# Patient Record
Sex: MALE | Race: OTHER | URBAN - METROPOLITAN AREA
[De-identification: names, ages, dates, MRNs, and addresses within clinical notes are randomized per-mention and may not be internally consistent; named-entity substitution may affect disease eponyms.]

---

## 2018-01-07 ENCOUNTER — EMERGENCY (EMERGENCY)
Facility: HOSPITAL | Age: 73
LOS: 1 days | Discharge: HOME | End: 2018-01-07
Attending: EMERGENCY MEDICINE | Admitting: EMERGENCY MEDICINE
Payer: MEDICARE

## 2018-01-07 VITALS
OXYGEN SATURATION: 100 % | TEMPERATURE: 99 F | HEART RATE: 75 BPM | DIASTOLIC BLOOD PRESSURE: 75 MMHG | SYSTOLIC BLOOD PRESSURE: 145 MMHG | RESPIRATION RATE: 16 BRPM

## 2018-01-07 LAB
ALBUMIN SERPL ELPH-MCNC: 3.7 G/DL — SIGNIFICANT CHANGE UP (ref 3.3–5)
ALP SERPL-CCNC: 57 U/L — SIGNIFICANT CHANGE UP (ref 40–120)
ALT FLD-CCNC: 12 U/L — SIGNIFICANT CHANGE UP (ref 4–41)
APTT BLD: 39.2 SEC — HIGH (ref 27.5–37.4)
AST SERPL-CCNC: 19 U/L — SIGNIFICANT CHANGE UP (ref 4–40)
BASOPHILS # BLD AUTO: 0.03 K/UL — SIGNIFICANT CHANGE UP (ref 0–0.2)
BASOPHILS NFR BLD AUTO: 0.3 % — SIGNIFICANT CHANGE UP (ref 0–2)
BILIRUB SERPL-MCNC: 0.4 MG/DL — SIGNIFICANT CHANGE UP (ref 0.2–1.2)
BUN SERPL-MCNC: 33 MG/DL — HIGH (ref 7–23)
CALCIUM SERPL-MCNC: 8.8 MG/DL — SIGNIFICANT CHANGE UP (ref 8.4–10.5)
CHLORIDE SERPL-SCNC: 94 MMOL/L — LOW (ref 98–107)
CO2 SERPL-SCNC: 27 MMOL/L — SIGNIFICANT CHANGE UP (ref 22–31)
CREAT SERPL-MCNC: 2.45 MG/DL — HIGH (ref 0.5–1.3)
EOSINOPHIL # BLD AUTO: 0 K/UL — SIGNIFICANT CHANGE UP (ref 0–0.5)
EOSINOPHIL NFR BLD AUTO: 0 % — SIGNIFICANT CHANGE UP (ref 0–6)
GLUCOSE SERPL-MCNC: 128 MG/DL — HIGH (ref 70–99)
HCT VFR BLD CALC: 38.8 % — LOW (ref 39–50)
HGB BLD-MCNC: 12.8 G/DL — LOW (ref 13–17)
IMM GRANULOCYTES # BLD AUTO: 0.04 # — SIGNIFICANT CHANGE UP
IMM GRANULOCYTES NFR BLD AUTO: 0.4 % — SIGNIFICANT CHANGE UP (ref 0–1.5)
INR BLD: 2.25 — HIGH (ref 0.88–1.17)
LYMPHOCYTES # BLD AUTO: 0.91 K/UL — LOW (ref 1–3.3)
LYMPHOCYTES # BLD AUTO: 8.5 % — LOW (ref 13–44)
MCHC RBC-ENTMCNC: 28.8 PG — SIGNIFICANT CHANGE UP (ref 27–34)
MCHC RBC-ENTMCNC: 33 % — SIGNIFICANT CHANGE UP (ref 32–36)
MCV RBC AUTO: 87.4 FL — SIGNIFICANT CHANGE UP (ref 80–100)
MONOCYTES # BLD AUTO: 1.07 K/UL — HIGH (ref 0–0.9)
MONOCYTES NFR BLD AUTO: 10 % — SIGNIFICANT CHANGE UP (ref 2–14)
NEUTROPHILS # BLD AUTO: 8.69 K/UL — HIGH (ref 1.8–7.4)
NEUTROPHILS NFR BLD AUTO: 80.8 % — HIGH (ref 43–77)
NRBC # FLD: 0 — SIGNIFICANT CHANGE UP
PLATELET # BLD AUTO: 151 K/UL — SIGNIFICANT CHANGE UP (ref 150–400)
PMV BLD: 11.5 FL — SIGNIFICANT CHANGE UP (ref 7–13)
POTASSIUM SERPL-MCNC: 3.9 MMOL/L — SIGNIFICANT CHANGE UP (ref 3.5–5.3)
POTASSIUM SERPL-SCNC: 3.9 MMOL/L — SIGNIFICANT CHANGE UP (ref 3.5–5.3)
PROT SERPL-MCNC: 7.7 G/DL — SIGNIFICANT CHANGE UP (ref 6–8.3)
PROTHROM AB SERPL-ACNC: 25.4 SEC — HIGH (ref 9.8–13.1)
RBC # BLD: 4.44 M/UL — SIGNIFICANT CHANGE UP (ref 4.2–5.8)
RBC # FLD: 14 % — SIGNIFICANT CHANGE UP (ref 10.3–14.5)
SODIUM SERPL-SCNC: 133 MMOL/L — LOW (ref 135–145)
WBC # BLD: 10.74 K/UL — HIGH (ref 3.8–10.5)
WBC # FLD AUTO: 10.74 K/UL — HIGH (ref 3.8–10.5)

## 2018-01-07 PROCEDURE — 99285 EMERGENCY DEPT VISIT HI MDM: CPT | Mod: GC

## 2018-01-07 RX ORDER — OXYCODONE AND ACETAMINOPHEN 5; 325 MG/1; MG/1
1 TABLET ORAL ONCE
Qty: 0 | Refills: 0 | Status: COMPLETED | OUTPATIENT
Start: 2018-01-07 | End: 2018-01-07

## 2018-01-07 RX ORDER — OXYCODONE AND ACETAMINOPHEN 5; 325 MG/1; MG/1
1 TABLET ORAL ONCE
Qty: 0 | Refills: 0 | Status: DISCONTINUED | OUTPATIENT
Start: 2018-01-07 | End: 2018-01-07

## 2018-01-07 RX ADMIN — OXYCODONE AND ACETAMINOPHEN 1 TABLET(S): 5; 325 TABLET ORAL at 15:43

## 2018-01-07 RX ADMIN — OXYCODONE AND ACETAMINOPHEN 1 TABLET(S): 5; 325 TABLET ORAL at 11:40

## 2018-01-07 RX ADMIN — Medication 1 TABLET(S): at 15:41

## 2018-01-07 NOTE — ED PROVIDER NOTE - OBJECTIVE STATEMENT
73 y/o m c/o tooth pain/swelling. SYmptoms started 2 days ago, pain constant, located in front upper tooth, moderate, associated with swelling to gum/lip and part of face. No associated fevers, ha, cp, sob, abd pain, vomiting, diarrhea, dysuria. patient on coumadin as had prior stroke. 71 y/o m c/o tooth pain/swelling. SYmptoms started 2 days ago, pain constant, located in front upper tooth, moderate, associated with swelling to gum/lip and part of face. No associated fevers, ha, cp, sob, difficulty opening mouth, abd pain, vomiting, diarrhea, dysuria. patient on coumadin as had prior stroke.

## 2018-01-07 NOTE — ED PROVIDER NOTE - MOUTH
+swelling ext from tooth 10 to left cheek and infraorbital region, no crepitus, no facial warmth, no trismus, no tongue elevation or drooling

## 2018-01-07 NOTE — ED PROVIDER NOTE - PROGRESS NOTE DETAILS
Dr. Sanjay Orantes PGY1: paged dental for consult dental res got stuck in procedure she needed to address but will see patient as soon as she is done Dr. Sanjay Orantes PGY1: I&D performed by dental and OMFS in ED, requested augmentin prescription, will give one dose in ED; will be dc'd w/ outpt dental clinic f/u Dr. Sanjay Orantes PGY1: I&D performed by dental and OMFS in ED, requested augmentin prescription, will give one dose in ED; will be dc'd w/ outpt dental clinic f/u in 2 d for removal of catheter placed by them

## 2018-01-07 NOTE — ED ADULT NURSE NOTE - OBJECTIVE STATEMENT
Pt received to intake room 10B with reports of L sided dental pain x2 days. Pt received awake, alert, oriented x4. Pt denies SOB or chest pain, denies difficulty breathing or swallowing. L side of face observed to be visibly swollen. Pt currently reporting pain as 10/10 at this time, pt medicated per orders. 20g PIV placed in L AC, labs drawn and sent per orders. Visitor at bedside identified as son. Will continue to monitor.

## 2018-01-07 NOTE — PROGRESS NOTE ADULT - SUBJECTIVE AND OBJECTIVE BOX
Patient is a 72y old  Male who presents with a chief complaint of facial swelling that began three days ago.   HPI: Facial swelling was worst on Friday, per patient's son. On Friday, patient's son reports patient's left eye was swollen shut. Patient reports no difficulty seeing.     PAST MEDICAL & SURGICAL HISTORY:  Pancreatitis  Stroke  DM (diabetes mellitus)  HTN (hypertension)  No significant past surgical history      MEDICATIONS  (STANDING):  oxyCODONE    5 mG/acetaminophen 325 mG 1 Tablet(s) Oral Once  Warfarin (INR: 2.25 <H> [0.88 - 1.17] (01-07 @ 11:39)    Allergies    No Known Allergies    *Last Dental Visit: Patient does not see a dentist regularly.     Vital Signs Last 24 Hrs  T(C): 37.1 (07 Jan 2018 10:39), Max: 37.1 (07 Jan 2018 10:39)  T(F): 98.8 (07 Jan 2018 10:39), Max: 98.8 (07 Jan 2018 10:39)  HR: 75 (07 Jan 2018 10:39) (75 - 75)  BP: 145/75 (07 Jan 2018 10:39) (145/75 - 145/75)  BP(mean): --  RR: 16 (07 Jan 2018 10:39) (16 - 16)  SpO2: 100% (07 Jan 2018 10:39) (100% - 100%)    EOE:  TMJ ( -  ) clicks                    (  -  ) pops                    (  -  ) crepitus             Mandible <<FROM>>             Facial bones and MOM <<grossly intact>>             ( -  ) trismus             ( -  ) LAD             (  + ) swelling. Patient has right side infraorbital swelling present from upper left lip to lower eyelid. Patient's eyelid is open, and patient reports no difficulty seeing.              ( +  ) asymmetry             ( +  ) palpation             ( -  ) SOB             ( -  ) dysphagia             ( -  ) LOC    IOE:  Multiple missing teeth. Vestibular and gingival abscess associated with tooth #10. Tooth #10 exhibits class II mobility. Teeth #7-10 in anterior region are all severely worn down.     LABS:                        12.8   10.74 )-----------( 151      ( 07 Jan 2018 11:39 )             38.8     01-07    133<L>  |  94<L>  |  33<H>  ----------------------------<  128<H>  3.9   |  27  |  2.45<H>    Ca    8.8      07 Jan 2018 11:39    TPro  7.7  /  Alb  3.7  /  TBili  0.4  /  DBili  x   /  AST  19  /  ALT  12  /  AlkPhos  57  01-07    WBC Count: 10.74 K/uL <H> [3.8 - 10.5] (01-07 @ 11:39)  Platelet Count - Automated: 151 K/uL [150 - 400] (01-07 @ 11:39)  INR: 2.25 <H> [0.88 - 1.17] (01-07 @ 11:39)    RADIOGRAPH: PA #10 taken. Panoramic taken.     ASSESSMENT: Tooth #10 is severely worn down, exhibits class II mobility and exhibits periapical radiolucency. No other pathology noted.     PROCEDURE:  Verbal consent given. OMFS assisted. Patient given 3 carpules 4% septocaine with 1:100,000 epinephrine and 2 carpules 2% lidocaine, with 1:100,000 epinephrine. Patient achieved profound anesthesia. Incision made to bone apical to tooth #10. Periosteal elevation used to curette insision. Hemepurulence appreciated. Irrigated with sterile saline. Penrose drain placed and secured with one 5-0 silk suture. POIG.      RECOMMENDATIONS:  1) Follow up in the dental clinic on Tuesday for drain removal/dental counseling.   2) Soft diet, OTC pain management   3) Dental F/U with outpatient dentist for comprehensive dental care and definitive treatment of tooth #10.  4) If any difficulty swallowing/breathing, fever occur, page dental.     Daphne Arnold DDS #[id number]

## 2018-01-07 NOTE — ED PROVIDER NOTE - MEDICAL DECISION MAKING DETAILS
73 y/o m presents with tooth pain and swelling of gum/lower face, concern for dental abscess, will give pain ctrl, dental consult, abx, reass. 73 y/o m presents with tooth pain and swelling of gum/lower face, patent airway, vss, concern for dental abscess, will give pain ctrl, dental consult, abx, reass.

## 2018-01-07 NOTE — ED ADULT TRIAGE NOTE - CHIEF COMPLAINT QUOTE
Pt has been having left side upper toothache since thursday states as of yesterday his left face has gotten swollen.  Pt is noted with left side facial swelling erythema and  swelling to the left eye and.  PMH on coumadin Hx of DM FS 162mg/dl

## 2018-01-09 LAB — SPECIMEN SOURCE: SIGNIFICANT CHANGE UP

## 2018-01-10 LAB — BACTERIA WND CULT: SIGNIFICANT CHANGE UP

## 2018-01-10 NOTE — ED POST DISCHARGE NOTE - RESULT SUMMARY
WCX: normal resp jennifer prelim. Admin P.A. to follow results to final. Patient discharged home with a prescription for Augmentin.

## 2018-03-05 ENCOUNTER — EMERGENCY (EMERGENCY)
Facility: HOSPITAL | Age: 73
LOS: 1 days | Discharge: HOME | End: 2018-03-05
Attending: EMERGENCY MEDICINE | Admitting: EMERGENCY MEDICINE
Payer: MEDICARE

## 2018-03-05 VITALS
TEMPERATURE: 98 F | SYSTOLIC BLOOD PRESSURE: 166 MMHG | OXYGEN SATURATION: 100 % | HEART RATE: 73 BPM | RESPIRATION RATE: 16 BRPM | DIASTOLIC BLOOD PRESSURE: 78 MMHG

## 2018-03-05 PROCEDURE — 73590 X-RAY EXAM OF LOWER LEG: CPT | Mod: 26,RT

## 2018-03-05 PROCEDURE — 93971 EXTREMITY STUDY: CPT | Mod: 26,LT

## 2018-03-05 PROCEDURE — 99284 EMERGENCY DEPT VISIT MOD MDM: CPT

## 2018-03-05 RX ORDER — IBUPROFEN 200 MG
600 TABLET ORAL ONCE
Qty: 0 | Refills: 0 | Status: COMPLETED | OUTPATIENT
Start: 2018-03-05 | End: 2018-03-05

## 2018-03-05 RX ORDER — ACETAMINOPHEN 500 MG
650 TABLET ORAL ONCE
Qty: 0 | Refills: 0 | Status: COMPLETED | OUTPATIENT
Start: 2018-03-05 | End: 2018-03-05

## 2018-03-05 RX ADMIN — Medication 650 MILLIGRAM(S): at 21:22

## 2018-03-05 NOTE — ED PROVIDER NOTE - LOWER EXTREMITY EXAM, RIGHT
TENDERNESS/SWELLING/BRUISING/tender anterior contusion over tibia, tenderness to hamstring, minimal swelling, normal capillary refill

## 2018-03-05 NOTE — ED ADULT TRIAGE NOTE - CHIEF COMPLAINT QUOTE
Pt states he was hit in the right leg by a door in his apartment building on Thursday pt c/o of right leg pain and swelling.

## 2018-03-05 NOTE — ED PROVIDER NOTE - CARE PLAN
Principal Discharge DX:	Contusion  Assessment and plan of treatment:	Follow up with your Doctor in 1-2 days.  Apply Ice to affected area 3-4 x a day.  Take Tylenol 650mg orally every 6 hours as needed for pain.  Return to the ER for any persistent/worsening or new symptoms weakness, numbness, swelling, fevers, chills or any concerning symptoms.

## 2018-03-05 NOTE — ED PROVIDER NOTE - MEDICAL DECISION MAKING DETAILS
72 year old male with likely shin contusion with associated hamstring injury. Given that the patient is on anticoagulants, we will get an ultrasound to rule out DVT, and an Xray to rule out a fracture.

## 2018-03-05 NOTE — ED PROVIDER NOTE - PLAN OF CARE
Follow up with your Doctor in 1-2 days.  Apply Ice to affected area 3-4 x a day.  Take Tylenol 650mg orally every 6 hours as needed for pain.  Return to the ER for any persistent/worsening or new symptoms weakness, numbness, swelling, fevers, chills or any concerning symptoms.

## 2018-03-05 NOTE — ED PROVIDER NOTE - PROGRESS NOTE DETAILS
DORIS Chacon: received sign out from DORIS Wyatt to follow up u/s if negative d/c home.  U/S negative no DVT.  Discharge and results reviewed with patient.  Pt offered and declined translation services pt requesting his son Vern Biggs translate.  Pt ambulatory with eloy.

## 2018-03-05 NOTE — ED PROVIDER NOTE - OBJECTIVE STATEMENT
72 year old male with a PMHx of HTN, DM, and a stroke (no weakness at baseline), presents with right leg pain x 4 days. He notes that he was going food shopping and a door hit him in his medial right leg producing a bruise and a contusion. The patient's pain is mostly posterior and in the hamstring. He was able to ambulate after the injury but has pain with walking. He presents in a wheelchair. He denies any history of blood clots. He has not taken any medication for his pain. He denies fever, chills, nausea, vomiting, weakness, numbness, tingling, or any other complaint.

## 2018-03-12 ENCOUNTER — EMERGENCY (EMERGENCY)
Facility: HOSPITAL | Age: 73
LOS: 1 days | Discharge: LEFT BEFORE DISCHARGE | End: 2018-03-12
Attending: EMERGENCY MEDICINE | Admitting: EMERGENCY MEDICINE
Payer: MEDICARE

## 2018-03-12 VITALS
HEART RATE: 95 BPM | SYSTOLIC BLOOD PRESSURE: 180 MMHG | DIASTOLIC BLOOD PRESSURE: 79 MMHG | OXYGEN SATURATION: 98 % | RESPIRATION RATE: 16 BRPM

## 2018-03-12 VITALS
RESPIRATION RATE: 18 BRPM | OXYGEN SATURATION: 98 % | SYSTOLIC BLOOD PRESSURE: 180 MMHG | DIASTOLIC BLOOD PRESSURE: 90 MMHG | HEART RATE: 85 BPM | TEMPERATURE: 98 F

## 2018-03-12 DIAGNOSIS — H91.90 UNSPECIFIED HEARING LOSS, UNSPECIFIED EAR: ICD-10-CM

## 2018-03-12 DIAGNOSIS — I10 ESSENTIAL (PRIMARY) HYPERTENSION: ICD-10-CM

## 2018-03-12 LAB
ALBUMIN SERPL ELPH-MCNC: 4.2 G/DL — SIGNIFICANT CHANGE UP (ref 3.3–5)
ALP SERPL-CCNC: 68 U/L — SIGNIFICANT CHANGE UP (ref 40–120)
ALT FLD-CCNC: 21 U/L — SIGNIFICANT CHANGE UP (ref 4–41)
APTT BLD: 60.7 SEC — HIGH (ref 27.5–37.4)
AST SERPL-CCNC: 27 U/L — SIGNIFICANT CHANGE UP (ref 4–40)
BASE EXCESS BLDV CALC-SCNC: 1.4 MMOL/L — SIGNIFICANT CHANGE UP
BILIRUB SERPL-MCNC: 0.4 MG/DL — SIGNIFICANT CHANGE UP (ref 0.2–1.2)
BLOOD GAS VENOUS - CREATININE: 1.71 MG/DL — HIGH (ref 0.5–1.3)
BUN SERPL-MCNC: 25 MG/DL — HIGH (ref 7–23)
CALCIUM SERPL-MCNC: 9.2 MG/DL — SIGNIFICANT CHANGE UP (ref 8.4–10.5)
CHLORIDE BLDV-SCNC: 107 MMOL/L — SIGNIFICANT CHANGE UP (ref 96–108)
CHLORIDE SERPL-SCNC: 102 MMOL/L — SIGNIFICANT CHANGE UP (ref 98–107)
CO2 SERPL-SCNC: 24 MMOL/L — SIGNIFICANT CHANGE UP (ref 22–31)
CREAT SERPL-MCNC: 1.74 MG/DL — HIGH (ref 0.5–1.3)
GAS PNL BLDV: 139 MMOL/L — SIGNIFICANT CHANGE UP (ref 136–146)
GLUCOSE BLDV-MCNC: 128 — HIGH (ref 70–99)
GLUCOSE SERPL-MCNC: 122 MG/DL — HIGH (ref 70–99)
HCO3 BLDV-SCNC: 25 MMOL/L — SIGNIFICANT CHANGE UP (ref 20–27)
HCT VFR BLD CALC: 35.4 % — LOW (ref 39–50)
HCT VFR BLDV CALC: 36 % — LOW (ref 39–51)
HGB BLD-MCNC: 11.5 G/DL — LOW (ref 13–17)
HGB BLDV-MCNC: 11.7 G/DL — LOW (ref 13–17)
INR BLD: 4.78 — HIGH (ref 0.88–1.17)
LACTATE BLDV-MCNC: 1.7 MMOL/L — SIGNIFICANT CHANGE UP (ref 0.5–2)
MCHC RBC-ENTMCNC: 28 PG — SIGNIFICANT CHANGE UP (ref 27–34)
MCHC RBC-ENTMCNC: 32.5 % — SIGNIFICANT CHANGE UP (ref 32–36)
MCV RBC AUTO: 86.3 FL — SIGNIFICANT CHANGE UP (ref 80–100)
NRBC # FLD: 0 — SIGNIFICANT CHANGE UP
PCO2 BLDV: 44 MMHG — SIGNIFICANT CHANGE UP (ref 41–51)
PH BLDV: 7.39 PH — SIGNIFICANT CHANGE UP (ref 7.32–7.43)
PLATELET # BLD AUTO: 253 K/UL — SIGNIFICANT CHANGE UP (ref 150–400)
PMV BLD: 10.7 FL — SIGNIFICANT CHANGE UP (ref 7–13)
PO2 BLDV: 31 MMHG — LOW (ref 35–40)
POTASSIUM BLDV-SCNC: 3.7 MMOL/L — SIGNIFICANT CHANGE UP (ref 3.4–4.5)
POTASSIUM SERPL-MCNC: 3.9 MMOL/L — SIGNIFICANT CHANGE UP (ref 3.5–5.3)
POTASSIUM SERPL-SCNC: 3.9 MMOL/L — SIGNIFICANT CHANGE UP (ref 3.5–5.3)
PROT SERPL-MCNC: 8.5 G/DL — HIGH (ref 6–8.3)
PROTHROM AB SERPL-ACNC: 54.8 SEC — HIGH (ref 9.8–13.1)
RBC # BLD: 4.1 M/UL — LOW (ref 4.2–5.8)
RBC # FLD: 15.5 % — HIGH (ref 10.3–14.5)
SAO2 % BLDV: 51.7 % — LOW (ref 60–85)
SODIUM SERPL-SCNC: 140 MMOL/L — SIGNIFICANT CHANGE UP (ref 135–145)
WBC # BLD: 7.04 K/UL — SIGNIFICANT CHANGE UP (ref 3.8–10.5)
WBC # FLD AUTO: 7.04 K/UL — SIGNIFICANT CHANGE UP (ref 3.8–10.5)

## 2018-03-12 PROCEDURE — 70450 CT HEAD/BRAIN W/O DYE: CPT | Mod: 26

## 2018-03-12 PROCEDURE — 73562 X-RAY EXAM OF KNEE 3: CPT | Mod: 26,RT

## 2018-03-12 PROCEDURE — 93971 EXTREMITY STUDY: CPT | Mod: 26,LT

## 2018-03-12 PROCEDURE — 73590 X-RAY EXAM OF LOWER LEG: CPT | Mod: 26,RT

## 2018-03-12 PROCEDURE — 99285 EMERGENCY DEPT VISIT HI MDM: CPT | Mod: 25,GC

## 2018-03-12 PROCEDURE — 93010 ELECTROCARDIOGRAM REPORT: CPT

## 2018-03-12 RX ORDER — METOCLOPRAMIDE HCL 10 MG
10 TABLET ORAL ONCE
Qty: 0 | Refills: 0 | Status: COMPLETED | OUTPATIENT
Start: 2018-03-12 | End: 2018-03-12

## 2018-03-12 RX ORDER — ACETAMINOPHEN 500 MG
1000 TABLET ORAL ONCE
Qty: 0 | Refills: 0 | Status: COMPLETED | OUTPATIENT
Start: 2018-03-12 | End: 2018-03-12

## 2018-03-12 RX ORDER — METOPROLOL TARTRATE 50 MG
100 TABLET ORAL ONCE
Qty: 0 | Refills: 0 | Status: COMPLETED | OUTPATIENT
Start: 2018-03-12 | End: 2018-03-12

## 2018-03-12 RX ORDER — SODIUM CHLORIDE 9 MG/ML
1000 INJECTION INTRAMUSCULAR; INTRAVENOUS; SUBCUTANEOUS ONCE
Qty: 0 | Refills: 0 | Status: COMPLETED | OUTPATIENT
Start: 2018-03-12 | End: 2018-03-12

## 2018-03-12 RX ORDER — DOXAZOSIN MESYLATE 4 MG
2 TABLET ORAL AT BEDTIME
Qty: 0 | Refills: 0 | Status: DISCONTINUED | OUTPATIENT
Start: 2018-03-12 | End: 2018-03-16

## 2018-03-12 RX ADMIN — Medication 100 MILLIGRAM(S): at 23:29

## 2018-03-12 RX ADMIN — Medication 1000 MILLIGRAM(S): at 19:30

## 2018-03-12 RX ADMIN — Medication 20 MILLIGRAM(S): at 23:29

## 2018-03-12 RX ADMIN — Medication 400 MILLIGRAM(S): at 18:44

## 2018-03-12 RX ADMIN — Medication 10 MILLIGRAM(S): at 19:30

## 2018-03-12 RX ADMIN — Medication 2 MILLIGRAM(S): at 23:29

## 2018-03-12 RX ADMIN — SODIUM CHLORIDE 1000 MILLILITER(S): 9 INJECTION INTRAMUSCULAR; INTRAVENOUS; SUBCUTANEOUS at 18:44

## 2018-03-12 NOTE — ED PROVIDER NOTE - ENMT, MLM
Airway patent, Nasal mucosa clear. Mouth with normal mucosa. Throat has no vesicles, no oropharyngeal exudates and uvula is midline. TM clear bilat

## 2018-03-12 NOTE — ED PROVIDER NOTE - OBJECTIVE STATEMENT
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 +loss of balance. No dizziness/lightheaded. No numbness/weakness in extremities. Of note, patient had trauma to RLE last week, had neg Xray and dvt study but still c/o severe pain and swelling. Patient coumadin. No vision change.

## 2018-03-12 NOTE — ED PROVIDER NOTE - PROGRESS NOTE DETAILS
Neurology was consulted. Pt was seen by Neurology but refused to stay for an MRI, Will be signed out AMA. DR Bloch- The pt is clinically sober, A&Ox3, free from distracting injury. Throughout our interactions in the ED today, the pt has demonstrated concrete thinking/reasoning, has maintained an orderly/reasonable conversation, appears to have intact insight/judgment/reason, a sound mind and therefore in our opinion has capacity now to make decisions. Given the pt’s presentation, we communicated (in laymen's terms) our concern    The pt verbalized an understanding of our worries. We have communicated to the patient that the ED evaluation is incomplete & many troublesome conditions haven’t been r/o. We have discussed the need for further ED w/u so we can get more information about the pt’s condition. We have discussed the range of possible dx, potential testing & tx options. Our discussions included the potential outcomes of leaving AMA, including worsening of their condition, becoming permanently disabled/in pain/critically ill, or death.    Discussed with patient and son at length- concern about CVA, hearing loss, elevated INR and explained risk of bleeding, ( told to hold coumadin) Patient and son understands risk of death and disability if signs out AMA. In addition discussed risks of not being able to return if snow storm today.

## 2018-03-12 NOTE — CONSULT NOTE ADULT - PROBLEM SELECTOR RECOMMENDATION 9
Admit patient and obtain MRI brain w/out contrast, MRA head w/ neck w/out contrast.  Monitor on telemetry for afib.   Would hold coumadin and get cardiac work up.   Q4 hour neuro monitoring.   If no CVA, would recommend ENT consult. Admit patient and obtain MRI brain w/out contrast, MRA head w/ neck w/out contrast.  Q4 hour neuro checks.  Check HbA1c, LDL.  Monitor on telemetry for afib.   Would hold coumadin and get cardiac work up.    If no CVA, would recommend ENT consult.

## 2018-03-12 NOTE — CONSULT NOTE ADULT - ASSESSMENT
72 year old male with history of stroke with hearing loss on R on coumadin with INR 4 presents to ED with new L hearing loss since Sunday. Rest of neurologic exam normal and CT head unremarkable.   NIHSS 0, MRS 0   Rule out CVA (cerebellar/posterior). Other etiologies include inflammatory process or peripheral nerve involvement.   Concern for patient taking coumadin with elevated INR without known afib or other reason to need anticoagulation. 72 year old male with history of stroke with hearing loss on R on coumadin with INR 4 presents to ED with new L hearing loss since Sunday. Rest of neurologic exam normal and CT head unremarkable.   NIHSS 0, MRS 0   Rule out CVA (cerebellar/posterior). Other etiologies include inflammatory process or peripheral nerve involvement.  Concern for patient taking coumadin with elevated INR without known afib or other reason to need anticoagulation.   Nausea and vomiting may indicate cerebellar infarct vs. post-infectious inflammation of nerve.

## 2018-03-12 NOTE — ED PROVIDER NOTE - EYES, MLM
Clear bilaterally, pupils equal, round and reactive to light. horizontal nystagmus on L lateral and upward gaze

## 2018-03-12 NOTE — ED PROVIDER NOTE - ATTENDING CONTRIBUTION TO CARE
DR. BLOCH, ATTENDING MD-  I performed a face to face bedside interview with patient regarding history of present illness, review of symptoms and past medical history. I completed an independent physical exam.  I have discussed patient's plan of care with the resident.  Patient with onset hearing loss yesterday, hx of cva with hearing loss, 2000, also c/o unsteady gait. Well appearing, Deaf, tm clear, neck no bruits, abd soft nontender, heart sounds nml, nystagmus, on left gaze, no past pointing, lungs clear, ext petechiae at both ankles, hematoma right hamstrings, pain on range of knee- bruise on achilles

## 2018-03-12 NOTE — CONSULT NOTE ADULT - SUBJECTIVE AND OBJECTIVE BOX
Neurology Consult    Name  PHILOMENA ARECHIGA    72 year old male with HTN, DMII, CKD, and CVA which resulted in R hearing loss presents after waking up 2 days ago with new L hearing loss. Patient had no other symptoms from previous CVA however is currently on coumadin which he has been taking since then. INR checked by PCP, had appt to go today. In ED, currently INR is 4. Patient is not complaining of any other neurologic symptoms. Patient and son cannot recall if patient was diagnosed with atrial fibrillation. No ear pain. No visual changes or weakness. Does not currently see a neurologist.   NIHSS 0, MRS 0                                                  PAST MEDICAL & SURGICAL HISTORY:  Pancreatitis  Stroke  DM (diabetes mellitus)  HTN (hypertension)  No significant past surgical history          MEDICATIONS  (STANDING):    MEDICATIONS  (PRN):      Allergies    No Known Allergies    Intolerances        Objective  Vital Signs Last 24 Hrs  T(C): 36.4 (12 Mar 2018 19:11), Max: 36.7 (12 Mar 2018 13:18)  T(F): 97.6 (12 Mar 2018 19:11), Max: 98 (12 Mar 2018 13:18)  HR: 103 (12 Mar 2018 22:27) (74 - 103)  BP: 176/76 (12 Mar 2018 22:27) (163/95 - 180/90)  BP(mean): --  RR: 16 (12 Mar 2018 22:27) (13 - 18)  SpO2: 96% (12 Mar 2018 22:27) (96% - 100%)    General Exam   General appearance: No acute distress, well-nourished  Respiratory:    non-labored respirations               Neurological Exam  Mental Status:  alert, answers questions when speak loudly, fluent speech, following commands     Cranial Nerves: PERRL, EOMI, visual fields intact, no facial droop, no dysarthria    Motor:   Tone:   normal               Strength:  Upper extremity                          Delt       Bicep    Tricep                                                  R         5/5        5/5        5/5       5/5                                               L          5/5        5/5        5/5      5/5    Lower extremity                           HF          KE               DF         PF                                               R        5/5        5/5         5/5       5/5                                               L         5/5        5/5         5/5        5/5    Pronator drift:   none           Dysmetria: none with finger-to-nose testing  Tremor:  none appreciated at rest or in action    Sensation: intact grossly to light touch        Other Studies                          11.5   7.04  )-----------( 308      ( 12 Mar 2018 18:41 )             35.4     03-12    140  |  102  |  25<H>  ----------------------------<  122<H>  3.9   |  24  |  1.74<H>    Ca    9.2      12 Mar 2018 18:41    TPro  8.5<H>  /  Alb  4.2  /  TBili  0.4  /  DBili  x   /  AST  27  /  ALT  21  /  AlkPhos  68  03-12    LIVER FUNCTIONS - ( 12 Mar 2018 18:41 )  Alb: 4.2 g/dL / Pro: 8.5 g/dL / ALK PHOS: 68 u/L / ALT: 21 u/L / AST: 27 u/L / GGT: x             Radiology    CTh  < from: CT Head No Cont (03.12.18 @ 20:46) >    FINDINGS:     There is no evidence of mass or intracranial hemorrhage. Ventricles and   sulci are normal in size and configuration for the patient's stated age.   No midline shift or other significant mass effect is noted. There is no   CT evidence of acute territorial infarct.  The paranasal sinuses are unremarkable. Visualized orbits and orbital   contents are unremarkable.  Bone windows demonstrate no evidence of fracture or focal lytic or   blastic lesion.    Incidental finding of subcentimeter non-obstructive bilateral choroid   plexus cysts.    IMPRESSION:  No evidence of acute intracranial hemorrhage. No midline shift or mass   effect.    Bilateral choroid plexus cysts, benign entity.          < end of copied text > Neurology Consult    Name  PHILOMENA ARECHIGA    72 year old male with HTN, DMII, CKD, and CVA which resulted in R hearing loss presents after waking up 2 days ago with new L hearing loss. Patient had no other symptoms from previous CVA however is currently on coumadin which he has been taking since then. INR checked by PCP, had appt to go today. In ED, currently INR is 4. Patient is not complaining of any other neurologic symptoms, however was nauseas and vomiting for the past few days. Patient and son cannot recall if patient was diagnosed with atrial fibrillation. No ear pain. No visual changes or weakness. Does not currently see a neurologist.   NIHSS 0, MRS 0                                                  PAST MEDICAL & SURGICAL HISTORY:  Pancreatitis  Stroke  DM (diabetes mellitus)  HTN (hypertension)  No significant past surgical history          MEDICATIONS  (STANDING):    MEDICATIONS  (PRN):      Allergies    No Known Allergies    Intolerances        Objective  Vital Signs Last 24 Hrs  T(C): 36.4 (12 Mar 2018 19:11), Max: 36.7 (12 Mar 2018 13:18)  T(F): 97.6 (12 Mar 2018 19:11), Max: 98 (12 Mar 2018 13:18)  HR: 103 (12 Mar 2018 22:27) (74 - 103)  BP: 176/76 (12 Mar 2018 22:27) (163/95 - 180/90)  BP(mean): --  RR: 16 (12 Mar 2018 22:27) (13 - 18)  SpO2: 96% (12 Mar 2018 22:27) (96% - 100%)    General Exam   General appearance: No acute distress, well-nourished  Respiratory:    non-labored respirations               Neurological Exam  Mental Status:  alert, answers questions when speak loudly, fluent speech, following commands     Cranial Nerves: PERRL, EOMI, visual fields intact, no facial droop, no dysarthria    Motor:   Tone:   normal               Strength:  Upper extremity                          Delt       Bicep    Tricep                                                  R         5/5        5/5        5/5       5/5                                               L          5/5        5/5        5/5      5/5    Lower extremity                           HF          KE               DF         PF                                               R        5/5        5/5         5/5       5/5                                               L         5/5        5/5         5/5        5/5    Pronator drift:   none           Dysmetria: none with finger-to-nose testing  Tremor:  none appreciated at rest or in action    Sensation: intact grossly to light touch        Other Studies                          11.5   7.04  )-----------( 253      ( 12 Mar 2018 18:41 )             35.4     03-12    140  |  102  |  25<H>  ----------------------------<  122<H>  3.9   |  24  |  1.74<H>    Ca    9.2      12 Mar 2018 18:41    TPro  8.5<H>  /  Alb  4.2  /  TBili  0.4  /  DBili  x   /  AST  27  /  ALT  21  /  AlkPhos  68  03-12    LIVER FUNCTIONS - ( 12 Mar 2018 18:41 )  Alb: 4.2 g/dL / Pro: 8.5 g/dL / ALK PHOS: 68 u/L / ALT: 21 u/L / AST: 27 u/L / GGT: x             Radiology    CTh  < from: CT Head No Cont (03.12.18 @ 20:46) >    FINDINGS:     There is no evidence of mass or intracranial hemorrhage. Ventricles and   sulci are normal in size and configuration for the patient's stated age.   No midline shift or other significant mass effect is noted. There is no   CT evidence of acute territorial infarct.  The paranasal sinuses are unremarkable. Visualized orbits and orbital   contents are unremarkable.  Bone windows demonstrate no evidence of fracture or focal lytic or   blastic lesion.    Incidental finding of subcentimeter non-obstructive bilateral choroid   plexus cysts.    IMPRESSION:  No evidence of acute intracranial hemorrhage. No midline shift or mass   effect.    Bilateral choroid plexus cysts, benign entity.          < end of copied text >

## 2018-03-12 NOTE — ED ADULT TRIAGE NOTE - CHIEF COMPLAINT QUOTE
p/t c/o of headaches, lt ear pain and dizziness for 3 days denies any trauma p/t c/o of vomiting on and off as well nad noted

## 2018-03-12 NOTE — ED PROVIDER NOTE - MEDICAL DECISION MAKING DETAILS
72 M with headache, vomiting and diminshed hearing in L ear. Exam with nystagmus and LE petechiae. Concern for cva/ich. Labs,  ct head, ivf, symptoms control. Will also obtain repeat us given persistent LE pain and swelling

## 2018-03-12 NOTE — ED ADULT NURSE NOTE - OBJECTIVE STATEMENT
Pt rcvd to room 15 c/o hearing loss. Pt has hx of Stroke 2000 with hearing loss to R ear after stroke. 2 days ago, pt was having HA, went to sleep and woke up yesterday with hearing loss to L ear. Pt unable to hear at this time. Son at bedside. Pt verbal. Able to communicate with pt through writing in [place]. Pt states he's been having N/V since yesterday, unable to hold food down. Loss of appetite and weakness. currently on coumadin. Awaiting MD villafana. Aox3, ambulatory with unsteady gait. Equal  observed at this time. Will continue to monitor.

## 2018-03-12 NOTE — ED PROVIDER NOTE - CRANIAL NERVE AND PUPILLARY EXAM
peripheral vision intact/tongue is midline/extra-ocular movements intact/horizontal nystagmus on L lateral and upward gaze, no hearing in bilat ears

## 2022-02-14 PROBLEM — Z00.00 ENCOUNTER FOR PREVENTIVE HEALTH EXAMINATION: Status: ACTIVE | Noted: 2022-02-14

## 2022-03-15 ENCOUNTER — APPOINTMENT (OUTPATIENT)
Dept: OTOLARYNGOLOGY | Facility: CLINIC | Age: 77
End: 2022-03-15

## 2022-05-17 ENCOUNTER — APPOINTMENT (OUTPATIENT)
Dept: OTOLARYNGOLOGY | Facility: CLINIC | Age: 77
End: 2022-05-17

## 2022-09-01 NOTE — ED PROVIDER NOTE - REFUSAL OF SERVICE, MDM
Airway  Performed by: Humberto Woo MD  Authorized by: Humberto Woo MD     Final Airway Type:  Supraglottic airway  Final Supraglottic Airway:  Unique  SGA Size*:  4  Attempts*:  1  Number of Other Approaches Attempted:  0   Patient Identified, Procedure confirmed, Emergency equipment available and Safety protocols followed  Location:  OR  Urgency:  Elective  Difficult Airway: No    Indications for Airway Management:  Anesthesia  Spontaneous Ventilation: present    Sedation Level:  Deep  Mask Difficulty Assessment:  1 - vent by mask  Performed By:  Anesthesiologist  Anesthesiologist:  Humberto Woo MD    
The pt is refusing any further care and is leaving against medical advice.We have answered all questions and have implored the pt to return ASAP to complete the w/u. Both the patient and son understand risks of death and disability

## 2023-02-28 ENCOUNTER — APPOINTMENT (OUTPATIENT)
Dept: OTOLARYNGOLOGY | Facility: CLINIC | Age: 78
End: 2023-02-28

## 2023-10-26 PROBLEM — K85.90 ACUTE PANCREATITIS WITHOUT NECROSIS OR INFECTION, UNSPECIFIED: Chronic | Status: ACTIVE | Noted: 2018-01-07

## 2023-10-26 PROBLEM — E11.9 TYPE 2 DIABETES MELLITUS WITHOUT COMPLICATIONS: Chronic | Status: ACTIVE | Noted: 2018-01-07

## 2023-10-26 PROBLEM — I10 ESSENTIAL (PRIMARY) HYPERTENSION: Chronic | Status: ACTIVE | Noted: 2018-01-07

## 2023-10-26 PROBLEM — I63.9 CEREBRAL INFARCTION, UNSPECIFIED: Chronic | Status: ACTIVE | Noted: 2018-01-07
